# Patient Record
(demographics unavailable — no encounter records)

---

## 2021-05-10 NOTE — DIAGNOSTIC IMAGING REPORT
Clinical indication: Patient with cough and congestion times

approximately 2 weeks. Recent shunt revision.



EXAM: Chest x-ray PA and lateral views.



COMPARISONS: Chest x-ray dated 2020.



FINDINGS:

Interval placement of  shunt tubing overlying the right neck,

right chest, right abdomen region and incompletely imaged

overlying the right abdominal region.



LUNGS/ PLEURA: There is interval development of mild bilateral

perihilar ill-defined groundglass opacification.  There is no

lung consolidation seen. There is no pneumothorax. There is no

pleural effusion.



MEDIASTINUM: Unremarkable. 



PULMONARY VASCULATURE: Unremarkable.



HEART: Unremarkable.



BONES/ EXTRATHORACIC SOFT TISSUE:  Unremarkable.



IMPRESSION:

1: There is mild bilateral perihilar ill-defined ground glass

opacification which may represent bronchiolitis/ airway disease

or infectious process. 



2: Interval placement of  shunt tubing overlying the right

neck, right chest, right abdomen region and incompletely imaged

overlying the right abdominal region.



Dictated by: 



  Dictated on workstation # [id number]

## 2021-05-10 NOTE — ED PEDIATRIC ILLNESS
HPI-Pediatric Illness


General


Chief Complaint:  Pediatric Illness/Fever


Stated Complaint:  CONGESTION,COUGH


Nursing Triage Note:  


Cough/congestion x2 weeks. Pt was discharged from [facility] 1 week ago 


after a shunt revision. Mother stating pt was congested at that time but 


congestion continues to get worse.


Source:  family


Exam Limitations:  no limitations





History of Present Illness


Date Seen by Provider:  May 10, 2021


Time Seen by Provider:  11:15


Initial Comments


Patient is a 7-month 6-day-old who is brought to the emergency room for cough, 

congestion, increased work of breathing over the course of the last 2 weeks.  

Patient's grandmother is with her and states that they saw Dr. Beebe last week 

and they just prescribed her some nose drops.  No reported fevers, taking a 

bottle well.  Normal numbers of wet and dirty diapers.  No vomiting.  Baby has a

history of prematurity born at 24 weeks gestation.  She does have a  shunt 

that was recently revised about a week ago.  She follows at [facility] and

also with Dr. Beebe.  Child has a history of pretty significant eczema.  

Grandmother states that she has been using saline drops to suck out her nose.  

She does attend a  but is quite  from all the other children in 

her own room.  She has not been overly fussy.





All other review of systems reviewed and negative except as stated above.


Timing/Duration:  constant


Severity:  mild


Associated Symptoms:  No crying more, No drinking less, No decreased urination, 

No fussy


Presenting Symptoms:  trouble breathing, persistent cough





Allergies and Home Medications


Allergies


Coded Allergies:  


     No Known Drug Allergies (Unverified , 10/4/20)





Patient Home Medication List


Home Medication List Reviewed:  Yes





Review of Systems


Review of Systems


Constitutional:  see HPI


EENTM:  nose congestion


Respiratory:  cough


Cardiovascular:  no symptoms reported


Gastrointestinal:  no symptoms reported


Genitourinary:  no symptoms reported


Musculoskeletal:  no symptoms reported


Skin:  no symptoms reported


Psychiatric/Neurological:  No Symptoms Reported





All Other Systems Reviewed


Negative Unless Noted:  Yes





PMH-Pediatrics


Birth Weight:  660


Recent Foreign Travel:  No


Contact w/other who traveled:  No


Recent Infectious Disease Expo:  No


Seasonal Allergies:  No


HX Surgeries:  Yes ( shunt, PEG tube)


Hx Respiratory Disorders:  No


Hx Cardiovascular Disorders:  No


Hx Neurological Disorders:  Yes ( shunt)


Hx Genitourinary Disorders:  No


Hx Gastrointestinal Disorders:  Yes (PEG tube)


Hx Musculoskeletal Disorders:  No


Hx Endocrine Disorders:  No


HX ENT Disorders:  Yes ( shunt)


Hx Cancer:  No


Hx Psychiatric Problems:  No


HX Skin/Integumentary Disorder:  No





Physical Exam-Pediatric


Physical Exam





Vital Signs - First Documented








 5/10/21 5/10/21 5/10/21





 11:26 11:34 11:46


 


Temp 37.2  


 


Pulse 130  


 


Resp 40  


 


Pulse Ox   100


 


O2 Delivery  Room Air 





Capillary Refill :


Height, Weight, BMI


Height: '"


Weight: lbs. oz. kg;  BMI


Method:


General Appearance:  no acute distress, active, attentiveness (Normal 

attentiveness for age), playful, smiles


General Appearance-Infants:  nml consolability, nml feeding/suck


HENT:  head inspection normal, PERRL, other (TMs difficult to evaluate 

bilaterally secondary to size and cerumen)


Neck:  normal inspection


Respiratory:  other (Baby has mild suprasternal retractions and subcostal 

retractions, squeaky wheezes bilaterally)


Cardiovascular:  regular rate, rhythm, other (Brisk capillary refill)


Gastrointestinal:  non tender, soft


Extremities:  normal range of motion, normal inspection


Neurologic/Psychiatric:  alert


Skin:  normal color, warm/dry, other (Diffuse eczematous rash to the torso)





Progress/Results/Core Measures


Results/Orders


My Orders





Orders - FANY PEREIRA MD


Albuterol  Pre-Mix Nebs (Rt) (Proventil (5/10/21 11:45)


Svn Small Volume Nebulizer (5/10/21 11:40)


Chest 1 View, Ap/Pa Only (5/10/21 11:41)





Medications Given in ED





Current Medications








 Medications  Dose


 Ordered  Sig/Bharath


 Route  Start Time


 Stop Time Status Last Admin


Dose Admin


 


 Albuterol Sulfate  2.5 mg  ONCE  ONCE


 INH  5/10/21 11:45


 5/10/21 11:46 DC 5/10/21 11:46


2.5 MG








Vital Signs/I&O











 5/10/21 5/10/21 5/10/21





 11:26 11:34 11:46


 


Temp 37.2  


 


Pulse 130  


 


Resp 40  


 


B/P (MAP)   


 


Pulse Ox   100


 


O2 Delivery  Room Air Room Air











Progress


Progress Note :  


   Time:  12:32


Progress Note


Baby looks well after breathing treatment.  Moving lots of air and wheezes are 

gone.  She still has a very slight suprasternal retraction but is in no distress

whatsoever.  Chest x-ray shows a little perihilar fullness bilaterally 

representing possible bronchiolitis.  Child's has been afebrile.  She is eating 

well drinking well and appears nontoxic.  Will be sent home with return 

precautions.  Follow-up with Dr. Beebe.  Return for any worsening breathing 

issues.





Case discussed with Dr. Beebe.  Recommends home with albuterol nebulizer and 

follow-up tomorrow in clinic





Diagnostic Imaging





   Diagonstic Imaging:  Xray


   Plain Films/CT/US/NM/MRI:  chest


Comments


                 [facility].


                                [place], [place]





NAME:   BARTOLO FERMIN


MED REC#:   [id number]


ACCOUNT#:   [id number]


PT STATUS:   REG ER


:   2020


PHYSICIAN:   FANY PEREIRA MD


ADMIT DATE:   05/10/21/ER


                                   ***Draft***


Date of Exam:05/10/21





CHEST 1 VIEW, AP/PA ONLY








Clinical indication: Patient with cough and congestion times


approximately 2 weeks. Recent shunt revision.





EXAM: Chest x-ray PA and lateral views.





COMPARISONS: Chest x-ray dated 2020.





FINDINGS:


Interval placement of  shunt tubing overlying the right neck,


right chest, right abdomen region and incompletely imaged


overlying the right abdominal region.





LUNGS/ PLEURA: There is interval development of mild bilateral


perihilar ill-defined groundglass opacification.  There is no


lung consolidation seen. There is no pneumothorax. There is no


pleural effusion.





MEDIASTINUM: Unremarkable. 





PULMONARY VASCULATURE: Unremarkable.





HEART: Unremarkable.





BONES/ EXTRATHORACIC SOFT TISSUE:  Unremarkable.





IMPRESSION:


1: There is mild bilateral perihilar ill-defined ground glass


opacification which may represent bronchiolitis/ airway disease


or infectious process. 





2: Interval placement of  shunt tubing overlying the right


neck, right chest, right abdomen region and incompletely imaged


overlying the right abdominal region.





  Dictated on workstation # [id number]








Dict:   05/10/21 1220


Trans:   05/10/21 1228


CVB [id number]





Interpreted by:     PENG HEAD MD


Electronically signed by:





Departure


Impression





   Primary Impression:  


   Bronchiolitis


Disposition:  01 HOME, SELF-CARE


Condition:  Stable





Departure-Patient Inst.


Decision time for Depature:  12:44


Referrals:  


ANNIE HUNG MD (PCP/Family)


Primary Care Physician








TYRON BEEBE DO


Patient Instructions:  Bronchiolitis (DC)





Add. Discharge Instructions:  


Encourage fluids/bottles so that she stays well-hydrated.





Use the nebulizer every 6 hours and as needed for shortness of breath.





If she develops any worsening trouble breathing bring her back to the emergency 

room.





Please call Dr. Beebe's office today for a recheck appointment tomorrow in 

clinic.


Scripts


Albuterol Sulfate (Albuterol Sulfate) 2.5 Mg/3 Ml Vial.neb


2.5 MG INH Q6H PRN for WHEEZING, #50 EA 1 Refill


   Prov: FANY PEREIRA MD         5/10/21 


Nebulizer/Compressor (Comp-Air Nebulizer System) 1 Each Each


EACH MC Q6H PRN for WHEEZING, #1 0 Refills


   Prov: FANY PEREIRA MD         5/10/21











FANY PEREIRA MD         May 10, 2021 12:30

## 2021-05-29 NOTE — DIAGNOSTIC IMAGING REPORT
PROCEDURE: CT head without contrast.



TECHNIQUE: Multiple contiguous axial images were obtained through

the brain without the use of intravenous contrast. Auto Exposure

Controls were utilized during the CT exam to meet ALARA standards

for radiation dose reduction. 



INDICATION: Fever, vomiting.



FINDINGS: There is moderate positional plagiocephaly. A right

parietal ventriculostomy tube is in place. There is persistent

hydrocephalus. There is no mass, hemorrhage or extra-axial fluid

collection. There is also a left occipital ventriculostomy tube

in place. Visualized sinuses are clear.



IMPRESSION: 

1. Persistent hydrocephalus despite the presence of two

ventriculostomy tubes.

2. Moderate positional plagiocephaly.

3. No other acute intracranial abnormality. 



Dictated by: 



  Dictated on workstation # [id number]

## 2021-05-29 NOTE — DIAGNOSTIC IMAGING REPORT
INDICATION: Fever and vomiting.



COMPARISON: Prior examination from 05/10/2021.



FINDINGS: The heart size, mediastinal configuration, and

pulmonary vascularity are within normal limits. There is no

pleural effusion, pneumothorax, or pneumonia. The osseous

structures are unremarkable. 



IMPRESSION: No acute cardiopulmonary abnormality.



Dictated by: 



  Dictated on workstation # [id number]

## 2021-05-29 NOTE — ED PEDIATRIC ILLNESS
HPI-Pediatric Illness


General


Chief Complaint:  Pediatric Illness/Fever


Stated Complaint:  N/V,FEVER


Nursing Triage Note:  


GRANDMOTHER AND GRANDFATHER PRESENT WITH THIS 7 MO BABY STATING SHE HAS A FEVER 


.6 AT HOME. HAD CONTACTED [facility] WHERE HER NEURO DOCTOR, DR ZHANG




STATED BARTOLO NEEDS A CT SCAN TO CHECK HER SHUNT.


Source:  patient


Exam Limitations:  no limitations





History of Present Illness


Date Seen by Provider:  May 29, 2021


Time Seen by Provider:  18:36


Initial Comments


Here with grandparents who state the child has had a fever of 100.6 at home and 

had vomiting twice in the last 24 hours.  Child has  shunt after precipitous 

delivery at about 24 weeks gestation.  Since then she has had several revisions 

due to failure of one sort or another.  Child has healing  shunt scar to the 

posterior scalp on the right although it does have some scabbing associated with

that.  Grandmother states that this wound did not heal as well as the others.  

Last attempted feed was at about 330 in which the child vomited.  Grandparents 

state that the child is otherwise acting okay.  Does have a Ten button in 

place that they only use at nighttime.  Last urination was a few hours ago and 

they state that she has had several today.  No cough.  No runny nose.  

Grandparents are both vaccinated for COVID-19.  No one else around the child is 

sick.


Timing/Duration:  24 hours


Severity:  mild


Associated Symptoms:  eating less


Presenting Symptoms:  fever; No runny nose, No trouble breathing, No persistent 

cough, No diarrhea, No abdominal pain; vomiting; No skin rash





Allergies and Home Medications


Allergies


Coded Allergies:  


     No Known Drug Allergies (Unverified , 10/4/20)





Home Medications


Albuterol Sulfate 2.5 Mg/3 Ml Vial.neb, 2.5 MG INH Q6H PRN for WHEEZING


   Prescribed by: FANY PEREIRA on 5/10/21 1257





Patient Home Medication List


Home Medication List Reviewed:  Yes





Review of Systems


Review of Systems


Constitutional:  see HPI


EENTM:  no symptoms reported


Respiratory:  No cough, No short of breath


Cardiovascular:  no symptoms reported


Gastrointestinal:  No diarrhea; vomiting


Genitourinary:  no symptoms reported


Musculoskeletal:  no symptoms reported


Skin:  see HPI





All Other Systems Reviewed


Negative Unless Noted:  Yes





PMH-Pediatrics


Birth Weight:  660


Complications at birth:  


Precipitous delivery at 24 weeks requiring resuscitation and NICU


transport and stay.


Recent Foreign Travel:  No


Contact w/other who traveled:  No


Recent Infectious Disease Expo:  No


Hospitalization with Isolation:  Denies


Seasonal Allergies:  No


HX Surgeries:  Yes ( shunt, PEG tube)


Hx Respiratory Disorders:  No


Hx Cardiovascular Disorders:  No


Hx Neurological Disorders:  Yes ( shunt)


Hx Genitourinary Disorders:  No


Hx Gastrointestinal Disorders:  Yes (PEG tube)


Hx Musculoskeletal Disorders:  No


Hx Endocrine Disorders:  No


HX ENT Disorders:  Yes ( shunt)


Hx Cancer:  No


Hx Psychiatric Problems:  No


HX Skin/Integumentary Disorder:  No


Reviewed/Agree w Nursing PMH:  Yes


Significant Family History:  No Pertinent Family Hx





Physical Exam-Pediatric


Physical Exam





Vital Signs - First Documented








 21





 18:26 20:00


 


Temp 38.0 


 


Pulse 139 


 


Resp 38 


 


Pulse Ox 100 


 


O2 Delivery  Room Air





Capillary Refill :


Height, Weight, BMI


Height: '"


Weight: lbs. oz. kg;  BMI


Method:


General Appearance:  no acute distress, see HPI


General Appearance-Infants:  nml consolability, other ( shunt button and tube 

right posterior.  Healing scalp incision with some scabbing without fluctuance.)


HENT:  TMs normal, pharynx normal


Neck:  full range of motion, supple


Respiratory:  lungs clear, normal breath sounds


Cardiovascular:  regular rate, rhythm, no murmur


Gastrointestinal:  non tender, soft, other (Ten button in place central 

abdomen)


Extremities:  normal range of motion, non-tender


Neurologic/Psychiatric:  alert, normal mood/affect


Skin:  normal color, warm/dry





Progress/Results/Core Measures


Results/Orders


Lab Results





Laboratory Tests








Test


 21


18:42 21


18:45 21


21:11 Range/Units


 


 


Influenza Type A (RT-PCR) Not Detected    Not Detecte  


 


Influenza Type B (RT-PCR) Not Detected    Not Detecte  


 


SARS-CoV-2 RNA (RT-PCR) Not Detected    Not Detecte  


 


White Blood Count


 


 15.4 


 


 6.0-17.5


10^3/uL


 


Red Blood Count


 


 4.77 


 


 3.75-4.90


10^6/uL


 


Hemoglobin  12.7   10.2-13.8  g/dL


 


Hematocrit  39   30-42  %


 


Mean Corpuscular Volume  81   72-85  fL


 


Mean Corpuscular Hemoglobin  27   25-34  pg


 


Mean Corpuscular Hemoglobin


Concent 


 33 


 


 32-36  g/dL





 


Red Cell Distribution Width  13.2   10.0-14.5  %


 


Platelet Count


 


 417 H


 


 130-400


10^3/uL


 


Mean Platelet Volume  8.4 L  9.0-12.2  fL


 


Immature Granulocyte % (Auto)  0    %


 


Neutrophils (%) (Auto)  56   42-75  %


 


Lymphocytes (%) (Auto)  32   12-44  %


 


Monocytes (%) (Auto)  11   0-12  %


 


Eosinophils (%) (Auto)  1   0-10  %


 


Basophils (%) (Auto)  1   0-10  %


 


Neutrophils # (Auto)


 


 8.6 H


 


 1.5-8.5


10^3/uL


 


Lymphocytes # (Auto)


 


 4.8 


 


 4.0-10.5


10^3/uL


 


Monocytes # (Auto)


 


 1.7 H


 


 0.0-1.0


10^3/uL


 


Eosinophils # (Auto)


 


 0.2 


 


 0.0-0.3


10^3/uL


 


Basophils # (Auto)


 


 0.1 


 


 0.0-0.1


10^3/uL


 


Immature Granulocyte # (Auto)


 


 0.1 


 


 0.0-0.1


10^3/uL


 


Neutrophils % (Manual)  57    %


 


Lymphocytes % (Manual)  30    %


 


Monocytes % (Manual)  12    %


 


Eosinophils % (Manual)  1    %


 


Smudge Cells  SLIGHT    


 


Platelet Estimate  INCREASED    


 


Blood Morphology Comment  NORMAL    


 


Sodium Level  137   135-145  MMOL/L


 


Potassium Level  5.7 H  3.6-5.0  MMOL/L


 


Chloride Level  104     MMOL/L


 


Carbon Dioxide Level  18 L  21-32  MMOL/L


 


Anion Gap  15 H  5-14  MMOL/L


 


Blood Urea Nitrogen  10   7-18  MG/DL


 


Creatinine


 


 0.43 L


 


 0.60-1.30


MG/DL


 


BUN/Creatinine Ratio  23    


 


Glucose Level  100     MG/DL


 


Calcium Level  10.2 H  8.5-10.1  MG/DL


 


C-Reactive Protein High


Sensitivity 


 1.55 H


 


 0.00-0.50


MG/DL


 


Urine Color   YELLOW   


 


Urine Clarity   CLEAR   


 


Urine pH   7.5  5-9  


 


Urine Specific Gravity   1.015 L 1.016-1.022  


 


Urine Protein   TRACE H NEGATIVE  


 


Urine Glucose (UA)   NEGATIVE  NEGATIVE  


 


Urine Ketones   TRACE H NEGATIVE  


 


Urine Nitrite   NEGATIVE  NEGATIVE  


 


Urine Bilirubin   NEGATIVE  NEGATIVE  


 


Urine Urobilinogen   0.2  < = 1.0  MG/DL


 


Urine Leukocyte Esterase   NEGATIVE  NEGATIVE  


 


Urine RBC (Auto)   NEGATIVE  NEGATIVE  


 


Urine RBC   NONE   /HPF


 


Urine WBC   NONE   /HPF


 


Urine Squamous Epithelial


Cells 


 


 RARE 


  /HPF





 


Urine Crystals   NONE   /LPF


 


Urine Bacteria   NEGATIVE   /HPF


 


Urine Casts   NONE   /LPF


 


Urine Mucus   MODERATE H  /LPF


 


Urine Culture Indicated


 


 


 CULTURE


PENDING  











Micro Results





Microbiology


21 Respiratory Syncytial Virus Ag - Final, Complete


          





My Orders





Orders - JOSE LING MD


Ct Head Wo (21 18:41)


Chest 1 View, Ap/Pa Only (21 18:41)


Basic Metabolic Panel (21 18:41)


Cbc With Automated Diff (21 18:41)


Hs C Reactive Protein (21 18:41)


Blood Culture (21 18:41)


Influenza A And B By Pcr (21 18:41)


Rsv Antigen (21 18:41)


Covid 19 Inhouse Test (21 18:41)


Manual Differential (21 18:45)


D5 1/2 Ns 1000 Ml Iv Solution (Dextrose (21 20:45)


Ua Culture If Indicated (21 21:15)


Urine Culture (21 21:15)





Vital Signs/I&O











 21





 18:26 20:00 23:35


 


Temp 38.0  


 


Pulse 139 134 


 


Resp 38 38 


 


B/P (MAP)   


 


Pulse Ox 100 100 100


 


O2 Delivery  Room Air 











Progress


Progress Note :  


Progress Note


Seen and evaluated.  There are concerns for shunt failure or infection.  We will

evaluate for other causes including Covid, RSV and influenza.  CT head and chest

x-ray ordered.  We will attempt basic labs with CBC, BMP, CRP and blood culture 

ordered.  Monitor patient.  : I have initiated transfer proceedings with 

[facility] in [place], [place].  I have discussed the case 

with the neurosurgeon on-call Dr. Jasso who is familiar with the patient.  He 

has reviewed the films.  Not a significant concern for shunt failure right now 

and some concern for infection.  CRP is not significantly elevated although is 

slightly elevated.  Blood culture pending.  Child would benefit from evaluation 

at [facility] and he agrees.  This could be under the direction 

of the medical team.  : I did discuss the case with Dr. Singh, transport 

physician with all labs and current findings reviewed.  He accepts patient for 

transfer.  Patient will go by [facility] transport flight team.  

We are pending plain assignment for that.  All findings concerns were discussed 

with the patient's family who agree.  We have initiated D5 half-normal saline at

30 mL an hour for 1-1/2 maintenance fluids based on 5 kg weight.  Transport 

physician agrees.  We have also placed wee bag to try to collect urine if it 

presents itself.  Family updated and agree and are appreciative of the care.  

: We have collected urine which is negative but we are going to go ahead and

do a culture on the urine.  Transport pending.  Grandfather will go with child 

and his weight is 80 kg.  Child otherwise doing okay with fluids running.  2320:

Flight crew here.  Report given to them by me.  2328: To [facility] via 

flight crew.





Diagnostic Imaging





   Diagonstic Imaging:  Xray


   Plain Films/CT/US/NM/MRI:  chest


Comments


                 [facility] [place], [place]





NAME:   ZANDERBARTOLO


MED REC#:   [id number]


ACCOUNT#:   [id number]


PT STATUS:   REG ER


:   2020


PHYSICIAN:   JOSE LING MD


ADMIT DATE:   21/ER


                                   ***Draft***


Date of Exam:21





CHEST 1 VIEW, AP/PA ONLY








INDICATION: Fever and vomiting.





COMPARISON: Prior examination from 05/10/2021.





FINDINGS: The heart size, mediastinal configuration, and


pulmonary vascularity are within normal limits. There is no


pleural effusion, pneumothorax, or pneumonia. The osseous


structures are unremarkable. 





IMPRESSION: No acute cardiopulmonary abnormality.








  Dictated on workstation # [id number]








Dict:   21


Trans:   21


E [id number]





Interpreted by:     CRISTA ROSA MD


Electronically signed by:


   Reviewed:  Reviewed by Me








   Diagonstic Imaging:  CT


   Plain Films/CT/US/NM/MRI:  head


Comments


                 [facility] [place], [place]





NAME:   ZANDERBARTOLO


MED REC#:   [id number]


ACCOUNT#:   [id number]


PT STATUS:   REG ER


:   2020


PHYSICIAN:   JOSE LING MD


ADMIT DATE:   21/ER


                                   ***Draft***


Date of Exam:21





CT HEAD WO








PROCEDURE: CT head without contrast.





TECHNIQUE: Multiple contiguous axial images were obtained through


the brain without the use of intravenous contrast. Auto Exposure


Controls were utilized during the CT exam to meet ALARA standards


for radiation dose reduction. 





INDICATION: Fever, vomiting.





FINDINGS: There is moderate positional plagiocephaly. A right


parietal ventriculostomy tube is in place. There is persistent


hydrocephalus. There is no mass, hemorrhage or extra-axial fluid


collection. There is also a left occipital ventriculostomy tube


in place. Visualized sinuses are clear.





IMPRESSION: 


1. Persistent hydrocephalus despite the presence of two


ventriculostomy tubes.


2. Moderate positional plagiocephaly.


3. No other acute intracranial abnormality. 





  Dictated on workstation # [id number]








Dict:   21


Trans:   21


[facility] [id number]





Interpreted by:     CRISTA ROSA MD


Electronically signed by:


   Reviewed:  Reviewed by Me





Departure


Impression





   Primary Impression:  


   Fever in pediatric patient


   Additional Impression:  


    (ventriculoperitoneal) shunt status


Disposition:   XFER SHT-TRM HOSP


Condition:  Stable





Transfer


Transfer Reason:  Exceeds level of care


Time Spoke to Accepting Phy:  20:52


Transfer Time:  23:28


Transfer Facility:  


[place], [place], Dr. Singh accepting


Method of Transfer:  Air ([facility] transport)





Departure-Patient Inst.


Referrals:  


ANNIE HUNG MD (PCP/Family)


Primary Care Physician











JOSE LING MD          May 29, 2021 19:01